# Patient Record
Sex: MALE | URBAN - METROPOLITAN AREA
[De-identification: names, ages, dates, MRNs, and addresses within clinical notes are randomized per-mention and may not be internally consistent; named-entity substitution may affect disease eponyms.]

---

## 2023-12-05 ENCOUNTER — APPOINTMENT (RX ONLY)
Dept: URBAN - METROPOLITAN AREA CLINIC 1 | Facility: CLINIC | Age: 53
Setting detail: DERMATOLOGY
End: 2023-12-05

## 2023-12-05 DIAGNOSIS — L28.0 LICHEN SIMPLEX CHRONICUS: ICD-10-CM | Status: UNCHANGED

## 2023-12-05 DIAGNOSIS — L21.8 OTHER SEBORRHEIC DERMATITIS: ICD-10-CM | Status: INADEQUATELY CONTROLLED

## 2023-12-05 PROCEDURE — 99204 OFFICE O/P NEW MOD 45 MIN: CPT | Mod: 25

## 2023-12-05 PROCEDURE — ? INTRALESIONAL KENALOG

## 2023-12-05 PROCEDURE — ? COUNSELING

## 2023-12-05 PROCEDURE — ? PRESCRIPTION MEDICATION MANAGEMENT

## 2023-12-05 PROCEDURE — 11900 INJECT SKIN LESIONS </W 7: CPT

## 2023-12-05 PROCEDURE — ? DIAGNOSIS COMMENT

## 2023-12-05 ASSESSMENT — LOCATION DETAILED DESCRIPTION DERM
LOCATION DETAILED: LEFT SUPERIOR OCCIPITAL SCALP
LOCATION DETAILED: RIGHT SUPERIOR PARIETAL SCALP
LOCATION DETAILED: RIGHT SUPERIOR OCCIPITAL SCALP
LOCATION DETAILED: RIGHT SUPERIOR POSTAURICULAR SKIN
LOCATION DETAILED: MID-OCCIPITAL SCALP
LOCATION DETAILED: LEFT CENTRAL PARIETAL SCALP
LOCATION DETAILED: LEFT INFERIOR OCCIPITAL SCALP
LOCATION DETAILED: LEFT POSTAURICULAR SKIN

## 2023-12-05 ASSESSMENT — LOCATION SIMPLE DESCRIPTION DERM
LOCATION SIMPLE: POSTERIOR SCALP
LOCATION SIMPLE: SCALP

## 2023-12-05 ASSESSMENT — LOCATION ZONE DERM: LOCATION ZONE: SCALP

## 2023-12-05 NOTE — PROCEDURE: PRESCRIPTION MEDICATION MANAGEMENT
Initiate Treatment: Betamethasone 0.05% cream RX by PCP
Render In Strict Bullet Format?: No
Detail Level: Zone
Modify Regimen: Will send ointment version of Betamethasone 0.05% patient will switch after cream run out.

## 2023-12-05 NOTE — HPI: DRY SKIN
[de-identified] : 65 y/o man presents for initial visit to establish primary care w new internist. he feels well currently, no new concerns. previously followed w Dr Parker Lomas who has moved to a new practice. he also tried 2 other internists over past year but prefers to now establish in this office. \par history significant for R MCA infarct in 10/07. also had R MCA stent placed via neurosurgery in 3/08. he has residual L sided hemiparesis. followed w Dr Libman neurology in the past , last visit in 2018 as his status has remained stable. prior notes reviewed. no definitive cause of his R MCA stenosis was founds. 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 but he keeps in good spirit. no falls recently. he is cautious w ambulation, especially ascending stairs due to L sided hemiparesis. he is able to drive. he is on  qd and statin. \par he is a long term smoker 1 ppd x 30-40 years but he has not been able to nor plans to cut down on his smoking currently. has been advised on many occasions in the past re risks of continued smoking and CVA risk. he says he quit in the past and then promptly had his stroke in 2007. \par \par hx also significant for type II DM on oral rx, trying to manage his diet, hard for him to lose weight due to inactivity. last Hgba1c w prior MD in 1/20 was 7.4%. compliant w metformin, glimepiride \par HTN mildly uncontrolled, he is under some stress currently, compliant w rx \par hx of colon cancer dx age 43, s/p colon resection w Dr Flores, doing well since then. follows w his GI Dr Abraham Knapp regularly, last colonoscopy in 6/29 \par EMELY on CPAP \par OA of knee, managed w orthopedist, no intervention planned due to CV risk. managing conservatively \par \par had cardiovascular testing about 6 years ago w stress testing, unremarkable as per pt. he has known RBBB on EKG which he says has been present for eyars \par \par follows w ophthalmologist Dr Barron regularly. had transcranial doppler recently, adequate flow throughout \par \par he reports normal urination and bowel movements. tries to keep active and in good spirits, no depressive symptoms 
Is This A New Presentation Or A Follow-Up?: Dry Skin
How Severe Is Your Dry Skin?: mild
How Many Showers Or Baths Do You Take In One Day?: twice a day
Additional History: Patient was prescribed triamcinolone 0.025% ointment in the past which he claims did not work. \\nPatient has Betamethasone 0.05% cream but has not used it. Rx by PCP. \\nHomemade

## 2023-12-05 NOTE — PROCEDURE: DIAGNOSIS COMMENT
Detail Level: Simple
Comment: Patient will start using Betamethasone 0.05% cream RX from PCP. F/u on 3 weeks can send ointment version.
Render Risk Assessment In Note?: no

## 2023-12-05 NOTE — PROCEDURE: INTRALESIONAL KENALOG
How Many Mls Were Removed From The 40 Mg/Ml (10ml) Vial When Preparing The Injectable Solution?: 0
Ndc# For Kenalog Only: 5049-0440-91
Require Ndc Code?: No
Validate Note Data When Using Inventory: Yes
Detail Level: Detailed
Medical Necessity Clause: This procedure was medically necessary because the lesions that were treated were:
Treatment Number (Optional): 1
Kenalog Preparation: Kenalog
Kenalog Type Of Vial: Multiple Dose
Consent: The risks of atrophy were reviewed with the patient.
Total Volume (Ccs): 1.0
Concentration Of Kenalog Solution Injected (Mg/Ml): 4.0

## 2024-01-04 ENCOUNTER — APPOINTMENT (RX ONLY)
Dept: URBAN - METROPOLITAN AREA CLINIC 1 | Facility: CLINIC | Age: 54
Setting detail: DERMATOLOGY
End: 2024-01-04

## 2024-01-04 DIAGNOSIS — L28.0 LICHEN SIMPLEX CHRONICUS: ICD-10-CM

## 2024-01-04 DIAGNOSIS — L21.8 OTHER SEBORRHEIC DERMATITIS: ICD-10-CM

## 2024-01-04 PROCEDURE — ? ADDITIONAL NOTES

## 2024-01-04 PROCEDURE — ? INTRALESIONAL KENALOG

## 2024-01-04 PROCEDURE — ? COUNSELING

## 2024-01-04 PROCEDURE — ? PRESCRIPTION

## 2024-01-04 PROCEDURE — 11900 INJECT SKIN LESIONS </W 7: CPT

## 2024-01-04 PROCEDURE — ? DIAGNOSIS COMMENT

## 2024-01-04 PROCEDURE — ? PRESCRIPTION MEDICATION MANAGEMENT

## 2024-01-04 PROCEDURE — 99214 OFFICE O/P EST MOD 30 MIN: CPT | Mod: 25

## 2024-01-04 RX ORDER — TRIAMCINOLONE ACETONIDE 1 MG/G
OINTMENT TOPICAL
Qty: 80 | Refills: 1 | Status: ERX | COMMUNITY
Start: 2024-01-04

## 2024-01-04 RX ADMIN — TRIAMCINOLONE ACETONIDE: 1 OINTMENT TOPICAL at 00:00

## 2024-01-04 ASSESSMENT — LOCATION SIMPLE DESCRIPTION DERM
LOCATION SIMPLE: SCALP
LOCATION SIMPLE: POSTERIOR SCALP

## 2024-01-04 ASSESSMENT — LOCATION DETAILED DESCRIPTION DERM
LOCATION DETAILED: LEFT CENTRAL PARIETAL SCALP
LOCATION DETAILED: RIGHT SUPERIOR OCCIPITAL SCALP
LOCATION DETAILED: RIGHT SUPERIOR POSTAURICULAR SKIN
LOCATION DETAILED: MID-OCCIPITAL SCALP
LOCATION DETAILED: LEFT POSTAURICULAR SKIN
LOCATION DETAILED: LEFT INFERIOR OCCIPITAL SCALP
LOCATION DETAILED: RIGHT SUPERIOR PARIETAL SCALP
LOCATION DETAILED: LEFT SUPERIOR OCCIPITAL SCALP

## 2024-01-04 ASSESSMENT — LOCATION ZONE DERM: LOCATION ZONE: SCALP

## 2024-01-04 NOTE — PROCEDURE: ADDITIONAL NOTES
Additional Notes: ILK treatment 2 today. Increased from 4mg to 8mg. \\nDue to severity, will switch to biweekly ILK treatments for now.\\nWill begin TAC 0.1% to affected areas of the scalp BID until next follow up in 2 weeks.
Render Risk Assessment In Note?: yes
Detail Level: Generalized

## 2024-01-04 NOTE — PROCEDURE: DIAGNOSIS COMMENT
Detail Level: Simple
Comment: Worsened in appearance today. Will switch to triamcinolone 0.1% BID daily until next follow up. \\nDiscontinue betamethasone.
Render Risk Assessment In Note?: no

## 2024-01-04 NOTE — PROCEDURE: INTRALESIONAL KENALOG
How Many Mls Were Removed From The 40 Mg/Ml (10ml) Vial When Preparing The Injectable Solution?: 0
Ndc# For Kenalog Only: 3739-6082-08
Require Ndc Code?: No
Validate Note Data When Using Inventory: Yes
Detail Level: Detailed
Medical Necessity Clause: This procedure was medically necessary because the lesions that were treated were:
Treatment Number (Optional): 2
Kenalog Preparation: Kenalog
Kenalog Type Of Vial: Multiple Dose
Consent: The risks of atrophy were reviewed with the patient.
Total Volume (Ccs): 1.0
Concentration Of Kenalog Solution Injected (Mg/Ml): 8.0